# Patient Record
Sex: MALE | Race: WHITE | Employment: FULL TIME | URBAN - METROPOLITAN AREA
[De-identification: names, ages, dates, MRNs, and addresses within clinical notes are randomized per-mention and may not be internally consistent; named-entity substitution may affect disease eponyms.]

---

## 2019-05-22 ENCOUNTER — HOSPITAL ENCOUNTER (EMERGENCY)
Facility: HOSPITAL | Age: 19
Discharge: HOME | End: 2019-05-22
Attending: PEDIATRICS
Payer: COMMERCIAL

## 2019-05-22 VITALS
HEIGHT: 70 IN | TEMPERATURE: 100 F | WEIGHT: 154.56 LBS | DIASTOLIC BLOOD PRESSURE: 76 MMHG | RESPIRATION RATE: 22 BRPM | SYSTOLIC BLOOD PRESSURE: 129 MMHG | HEART RATE: 96 BPM | BODY MASS INDEX: 22.13 KG/M2 | OXYGEN SATURATION: 95 %

## 2019-05-22 DIAGNOSIS — B27.90 INFECTIOUS MONONUCLEOSIS WITHOUT COMPLICATION, INFECTIOUS MONONUCLEOSIS DUE TO UNSPECIFIED ORGANISM: Primary | ICD-10-CM

## 2019-05-22 PROCEDURE — 99284 EMERGENCY DEPT VISIT MOD MDM: CPT

## 2019-05-22 PROCEDURE — 96360 HYDRATION IV INFUSION INIT: CPT

## 2019-05-22 PROCEDURE — 85025 COMPLETE CBC W/AUTO DIFF WBC: CPT | Performed by: PEDIATRICS

## 2019-05-22 PROCEDURE — 87430 STREP A AG IA: CPT | Performed by: PEDIATRICS

## 2019-05-22 PROCEDURE — 87081 CULTURE SCREEN ONLY: CPT | Performed by: PEDIATRICS

## 2019-05-22 PROCEDURE — 87999 UNLISTED MICROBIOLOGY PX: CPT

## 2019-05-22 PROCEDURE — 86403 PARTICLE AGGLUT ANTBDY SCRN: CPT | Performed by: PEDIATRICS

## 2019-05-22 PROCEDURE — 80053 COMPREHEN METABOLIC PANEL: CPT | Performed by: PEDIATRICS

## 2019-05-22 PROCEDURE — 87502 INFLUENZA DNA AMP PROBE: CPT | Performed by: PEDIATRICS

## 2019-05-22 PROCEDURE — 87798 DETECT AGENT NOS DNA AMP: CPT | Performed by: PEDIATRICS

## 2019-05-23 NOTE — ED INITIAL ASSESSMENT (HPI)
Patient with flu-like symptoms with high fever for a few days. Patient states his neck began to feel stiff today, hurting a lot to bend or move it.

## 2019-05-23 NOTE — ED PROVIDER NOTES
Patient Seen in: [facility] Emergency Department    History   Patient presents with:  Fever (infectious)    Stated Complaint: fever, URI     HPI    18-year-old male here with 2-day history of flulike symptoms, sore throat, and neck pain today.   Trent exhibits no discharge. No scleral icterus. Neck: Normal range of motion. Neck supple. No JVD present. No tracheal deviation present. No thyromegaly present.    Mild bilateral cervical lymphadenopathy, no meningeal signs   Cardiovascular: Normal rate, regu WITH DIFFERENTIAL WITH PLATELET.   Procedure                               Abnormality         Status                     ---------                               -----------         ------                     CBC W/ DIFFERENTIAL[[id number]]          Abnormal worsen        Medications Prescribed:  Current Discharge Medication List

## 2019-07-02 PROCEDURE — 80074 ACUTE HEPATITIS PANEL: CPT | Performed by: INTERNAL MEDICINE

## 2019-07-02 PROCEDURE — 87389 HIV-1 AG W/HIV-1&-2 AB AG IA: CPT | Performed by: INTERNAL MEDICINE

## (undated) NOTE — ED AVS SNAPSHOT
Zeeshan Devika   MRN: [id number]    Department:  [facility] Emergency Department   Date of Visit:  5/22/2019           Disclosure     Insurance plans vary and the physician(s) referred by the ER may not be covered by your plan.  Please contact yo tell this physician (or your personal doctor if your instructions are to return to your personal doctor) about any new or lasting problems. The primary care or specialist physician will see patients referred from the [facility] Emergency Department.  Jersey Galloway